# Patient Record
Sex: MALE | NOT HISPANIC OR LATINO | Employment: OTHER | URBAN - METROPOLITAN AREA
[De-identification: names, ages, dates, MRNs, and addresses within clinical notes are randomized per-mention and may not be internally consistent; named-entity substitution may affect disease eponyms.]

---

## 2024-10-10 ENCOUNTER — OFFICE VISIT (OUTPATIENT)
Dept: PRIMARY CARE | Facility: CLINIC | Age: 69
End: 2024-10-10
Payer: MEDICARE

## 2024-10-10 VITALS
TEMPERATURE: 98.8 F | HEIGHT: 70 IN | WEIGHT: 190 LBS | BODY MASS INDEX: 27.2 KG/M2 | RESPIRATION RATE: 14 BRPM | HEART RATE: 67 BPM | OXYGEN SATURATION: 98 % | DIASTOLIC BLOOD PRESSURE: 78 MMHG | SYSTOLIC BLOOD PRESSURE: 132 MMHG

## 2024-10-10 DIAGNOSIS — Z11.59 ENCOUNTER FOR HEPATITIS C SCREENING TEST FOR LOW RISK PATIENT: ICD-10-CM

## 2024-10-10 DIAGNOSIS — E55.9 VITAMIN D DEFICIENCY: ICD-10-CM

## 2024-10-10 DIAGNOSIS — Z76.89 ESTABLISHING CARE WITH NEW DOCTOR, ENCOUNTER FOR: Primary | ICD-10-CM

## 2024-10-10 DIAGNOSIS — Z12.5 PROSTATE CANCER SCREENING: ICD-10-CM

## 2024-10-10 DIAGNOSIS — Z13.6 SCREENING FOR CARDIOVASCULAR CONDITION: ICD-10-CM

## 2024-10-10 DIAGNOSIS — Z00.00 MEDICARE ANNUAL WELLNESS VISIT, SUBSEQUENT: ICD-10-CM

## 2024-10-10 PROCEDURE — 1170F FXNL STATUS ASSESSED: CPT | Performed by: FAMILY MEDICINE

## 2024-10-10 PROCEDURE — 3008F BODY MASS INDEX DOCD: CPT | Performed by: FAMILY MEDICINE

## 2024-10-10 PROCEDURE — 1159F MED LIST DOCD IN RCRD: CPT | Performed by: FAMILY MEDICINE

## 2024-10-10 PROCEDURE — G0439 PPPS, SUBSEQ VISIT: HCPCS | Performed by: FAMILY MEDICINE

## 2024-10-10 PROCEDURE — 1160F RVW MEDS BY RX/DR IN RCRD: CPT | Performed by: FAMILY MEDICINE

## 2024-10-10 PROCEDURE — 1158F ADVNC CARE PLAN TLK DOCD: CPT | Performed by: FAMILY MEDICINE

## 2024-10-10 PROCEDURE — 1123F ACP DISCUSS/DSCN MKR DOCD: CPT | Performed by: FAMILY MEDICINE

## 2024-10-10 PROCEDURE — 1126F AMNT PAIN NOTED NONE PRSNT: CPT | Performed by: FAMILY MEDICINE

## 2024-10-10 PROCEDURE — 99215 OFFICE O/P EST HI 40 MIN: CPT | Performed by: FAMILY MEDICINE

## 2024-10-10 PROCEDURE — 1157F ADVNC CARE PLAN IN RCRD: CPT | Performed by: FAMILY MEDICINE

## 2024-10-10 ASSESSMENT — PATIENT HEALTH QUESTIONNAIRE - PHQ9
SUM OF ALL RESPONSES TO PHQ9 QUESTIONS 1 & 2: 0
1. LITTLE INTEREST OR PLEASURE IN DOING THINGS: NOT AT ALL
2. FEELING DOWN, DEPRESSED OR HOPELESS: NOT AT ALL

## 2024-10-10 ASSESSMENT — ENCOUNTER SYMPTOMS
DEPRESSION: 0
LOSS OF SENSATION IN FEET: 0
OCCASIONAL FEELINGS OF UNSTEADINESS: 0

## 2024-10-10 ASSESSMENT — ACTIVITIES OF DAILY LIVING (ADL)
BATHING: INDEPENDENT
DOING HOUSEWORK: INDEPENDENT
STIL DRIVING: YES
PREPARING MEALS: INDEPENDENT
DRESSING: INDEPENDENT
ADEQUATE_TO_COMPLETE_ADL: YES
PILL BOX USED: NO
JUDGMENT_ADEQUATE_SAFELY_COMPLETE_DAILY_ACTIVITIES: YES
TOILETING: INDEPENDENT
TAKING MEDICATION: INDEPENDENT
FEEDING: INDEPENDENT
MANAGING FINANCES: INDEPENDENT
USING TELEPHONE: INDEPENDENT
EATING: INDEPENDENT
GROCERY SHOPPING: INDEPENDENT
NEEDS ASSISTANCE WITH FOOD: INDEPENDENT
USING TRANSPORTATION: INDEPENDENT

## 2024-10-10 ASSESSMENT — COLUMBIA-SUICIDE SEVERITY RATING SCALE - C-SSRS
1. IN THE PAST MONTH, HAVE YOU WISHED YOU WERE DEAD OR WISHED YOU COULD GO TO SLEEP AND NOT WAKE UP?: NO
6. HAVE YOU EVER DONE ANYTHING, STARTED TO DO ANYTHING, OR PREPARED TO DO ANYTHING TO END YOUR LIFE?: NO
2. HAVE YOU ACTUALLY HAD ANY THOUGHTS OF KILLING YOURSELF?: NO

## 2024-10-10 ASSESSMENT — PAIN SCALES - GENERAL: PAINLEVEL: 0-NO PAIN

## 2024-10-10 NOTE — PATIENT INSTRUCTIONS
Problem List Items Addressed This Visit    None  Visit Diagnoses         Codes    Establishing care with new doctor, encounter for    -  Primary Z76.89    Medicare annual wellness visit, subsequent     Z00.00    Relevant Orders    Prostate Specific Antigen, Screen    Hepatitis C Antibody    Prostate cancer screening     Z12.5    Relevant Orders    Prostate Specific Antigen, Screen    Encounter for hepatitis C screening test for low risk patient     Z11.59    Relevant Orders    Hepatitis C Antibody    Screening for cardiovascular condition     Z13.6    Vitamin D deficiency     E55.9            Additional Visit Plans:  Notes:  PREVENTATIVE HEALTH SCREENINGS INCLUDED:  - Blood pressure screen.  - Blood work may include a cholesterol and diabetes screen if risk factors exist (overweight, high blood pressure etc); screening for sexually transmitted infections; a one time HIV screen for all individuals, and Hepatitis C Virus screening  - I encourage you to eat a low-fat, moderate-carbohydrate, low-calorie diet to maintain a normal BMI (under 25) to reduce heart disease, and risk for diabetes  - Moderate intensity exercise for 30 minutes 5 days per week is recommended  - Helpful resources recommended by the American Academy of Family Practice can be found at www.familydoctor.org or www.choosemyplate.gov  - Can also consider enrolling in a program such as Weight Watchers or Annita Esparzaig. The most effective diet is going to be one you can follow long term and turn into a lifestyle. The CDC recommends losing 0.5-2 lbs a week if overweight or obese.  - I recommend a routine vision check and dental cleanings every 6 months    - Colon cancer screening for all ages 45-75 or 85 years old periodically depending on results.  Done this year, you will send me a report.   - For men and women who have a 20 pack year smoking history and currently smoke or have quit in the past 15 years, screening for lung cancer with a yearly low dose CT  scan is recommended starting at age 50 until age 77 years - not due yet. This is a shared decision making visit.   - For men only who have smoked 100+ cigarettes anytime in their lifetime, a one time ultrasound screening for for abdominal aortic aneurysms starting at age 65 until 75 years old      IMMUNIZATIONS:  - Flu shot annually.  Due at this time, [facility]  - Tetanus booster every 10 years.  Up-to-date, next due 2033  - Two pneumococcal vaccinations after 65 years old.  Completed  - Shingles vaccine for those 50 years or older.  Completed    This was a shared decision making visit.    All [facility] including [facility] D [facility]     Next Wellness Exam Due  In 1 year from today      Francis Nash DO   10/10/24   4:14 PM

## 2024-10-10 NOTE — PROGRESS NOTES
Outpatient Visit Note    Chief Complaint   Patient presents with    Annual Exam       HPI:  Thomas Sever is a 69 y.o. male here  for a Medicare Wellness Visit and to establish care.    He has a history of vitamin D deficiency.  Is currently on an OTC 3000IU daily supplement for this. [facility]     [facility] all his labs and orders done there. Going there next month for his wellness check.     Health Maintenance  Immunizations: Shingles vaccination completed.  Tdap due 2033.  Pneumonia up-to-date.  Influenza is due.    Denies smoking or illicit drug use, drinks 0 alcoholic beverages a week. Patient reports routine vision checks and dental cleanings, and regular exercise with hiking and walking. Patient is not fasting for routine blood work today.     [facility] and [facility].     Otherwise denies fevers, chills, cold/flu symptoms, SOB, CP, N/V, abdominal pain, dysuria, hematuria, melena, diarrhea or LE edema     Advanced directives: in place      Living Will: Received 3/9/2015    Healthcare Power of Atty: Not Received     Hearing screen: reports difficulty with hearing, had hearing aides.     Does the patient use opioid medications: No    How does the patient rate their health status today: good    Cognitive Screen:  AAAx3  to person, place and time: Yes  3 word recall: watch, banana, pen - Immediate recall: Yes         - 5 minutes recall: Yes   Impression: No cognitive deficiency observed during screening or encounter today    Reviewed:   Past Medical History/Allergies:  Yes  Family History:  Yes  Social History:  Yes  Current Medications:  Yes  Vital Signs:  Yes  Advanced Directives:  discussed  Immunizations:  reviewed today  Home Safety:                    Up & Go test > 30 seconds?  No                   Home have rugs; lack grab bars in bathroom; lack handrail on stairs; have poor lighting?  No                    Hearing difficulties?  yes  Geriatric Assessment                   ADL areas requiring assistance:  Does not need help with Dressing, Eating, Ambulating, Toileting, Grooming, Hygiene.                    IADL areas requiring assistance:  Does not need help with Shopping, Housework, Accounting, Transportation, Driving.   Medications: reviewed  Current supplements:  Reviewed and recorded.   Other providers: Reviewed and recorded - Current providers and suppliers: Dr. Nash, [facility], Dr. Hansen, [facility]    Vision results:  No results found.     PHQ9/GAD7:         There are no problems to display for this patient.       History reviewed. No pertinent past medical history.     Current Medications  No current outpatient medications     Allergies  Allergies   Allergen Reactions    Amoxicillin-Pot Clavulanate Rash    Shellfish Containing Products Rash        Immunizations  Immunization History   Administered Date(s) Administered    Flu vaccine, quadrivalent, high-dose, preservative free, age 65y+ (FLUZONE) 09/30/2023    Flu vaccine, trivalent, preservative free, HIGH-DOSE, age 65y+ (Fluzone) 10/01/2019, 03/03/2020    Flu vaccine, trivalent, preservative free, age 6 months and greater (Fluarix/Fluzone/Flulaval) 10/01/2014    Influenza, injectable, quadrivalent 09/16/2017    Moderna SARS-CoV-2 Vaccination 01/05/2021, 02/02/2021, 11/02/2021, 07/11/2022    Pfizer COVID-19 vaccine, 12 years and older, (30mcg/0.3mL) (Comirnaty) 11/13/2023    Pneumococcal conjugate vaccine, 13-valent (PREVNAR 13) 01/01/2016    Pneumococcal polysaccharide vaccine, 23-valent, age 2 years and older (PNEUMOVAX 23) 10/01/2019    Tdap vaccine, age 7 year and older (BOOSTRIX, ADACEL) 01/01/2014, 11/13/2023    Zoster vaccine, recombinant, adult (SHINGRIX) 03/01/2019, 05/01/2019    Zoster, live 01/01/2016        Past Surgical History:   Procedure Laterality Date    APPENDECTOMY  03/09/2015    Appendectomy     CHOLECYSTECTOMY  03/09/2015    Cholecystectomy Laparoscopic    HEMORRHOID SURGERY  03/09/2015    Hemorrhoidectomy    OTHER SURGICAL HISTORY  03/09/2015    Amputation Of Ring Finger, With Neurectomy (Each)    TONSILLECTOMY  03/09/2015    Tonsillectomy    VASECTOMY  03/09/2015    Surgery Vas Deferens Vasectomy     No family history on file.  Social History     Tobacco Use    Smoking status: Never    Smokeless tobacco: Never   Substance Use Topics    Alcohol use: Never    Drug use: Never     Tobacco Use: Low Risk  (10/10/2024)    Patient History     Smoking Tobacco Use: Never     Smokeless Tobacco Use: Never     Passive Exposure: Not on file        ROS  All pertinent positive symptoms are included in the history of present illness.  All other systems have been reviewed and are negative and noncontributory to this patient's current ailments.    VITAL SIGNS  Vitals:    10/10/24 1536   BP: 132/78   Pulse: 67   Resp: 14   Temp: 37.1 °C (98.8 °F)   SpO2: 98%     Vitals:    10/10/24 1536   Weight: 86.2 kg (190 lb)      Body mass index is 27.26 kg/m².     PHYSICAL EXAM  GENERAL APPEARANCE: well nourished, well developed, looks like stated age, in no acute distress, not ill or tired appearing, conversing well.   HEENT: no trauma, normocephalic.   NECK: supple without rigidity, no neck mass was observed.   LUNGS: good chest wall expansion. In no acute respiratory distress.   EXTREMITIES: moving all extremities equally with no edema.   SKIN: normal skin color and pigmentation, without rash.   NEUROLOGIC EXAM: CN II-XII grossly intact, normal gait.   PSYCH: mood and affect appropriate; alert and oriented to time, place, person; no difficulty with speech or language.     Counseling:       Medication education:           Education:  The patient is counseled regarding potential side-effects of all new medications          Understanding:  Patient expressed understanding of information conveyed today          Adherence:  No barriers  to adherence identified    Preventative Services reviewed with patient and copy printed for patient.    Assessment/Plan   Problem List Items Addressed This Visit    None  Visit Diagnoses         Codes    Establishing care with new doctor, encounter for    -  Primary Z76.89    Medicare annual wellness visit, subsequent     Z00.00    Relevant Orders    Prostate Specific Antigen, Screen    Hepatitis C Antibody    Prostate cancer screening     Z12.5    Relevant Orders    Prostate Specific Antigen, Screen    Encounter for hepatitis C screening test for low risk patient     Z11.59    Relevant Orders    Hepatitis C Antibody    Screening for cardiovascular condition     Z13.6    Vitamin D deficiency     E55.9            Additional Visit Plans:  Notes:  PREVENTATIVE HEALTH SCREENINGS INCLUDED:  - Blood pressure screen.  - Blood work may include a cholesterol and diabetes screen if risk factors exist (overweight, high blood pressure etc); screening for sexually transmitted infections; a one time HIV screen for all individuals, and Hepatitis C Virus screening  - I encourage you to eat a low-fat, moderate-carbohydrate, low-calorie diet to maintain a normal BMI (under 25) to reduce heart disease, and risk for diabetes  - Moderate intensity exercise for 30 minutes 5 days per week is recommended  - Helpful resources recommended by the American Academy of Family Practice can be found at www.familydoctor.org or www.choosemyplate.gov  - Can also consider enrolling in a program such as Weight Watchers or Annita Esparzaig. The most effective diet is going to be one you can follow long term and turn into a lifestyle. The CDC recommends losing 0.5-2 lbs a week if overweight or obese.  - I recommend a routine vision check and dental cleanings every 6 months    - Colon cancer screening for all ages 45-75 or 85 years old periodically depending on results.  Done this year, you will send me a report.   - For men and women who have a 20 pack year  smoking history and currently smoke or have quit in the past 15 years, screening for lung cancer with a yearly low dose CT scan is recommended starting at age 50 until age 77 years - not due yet. This is a shared decision making visit.   - For men only who have smoked 100+ cigarettes anytime in their lifetime, a one time ultrasound screening for for abdominal aortic aneurysms starting at age 65 until 75 years old      IMMUNIZATIONS:  - Flu shot annually.  Due at this time, [facility]  - Tetanus booster every 10 years.  Up-to-date, next due 2033  - Two pneumococcal vaccinations after 65 years old.  Completed  - Shingles vaccine for those 50 years or older.  Completed    This was a shared decision making visit.    All [facility] including [facility] D [facility]     Next Wellness Exam Due  In 1 year from today      Francis Nash,    10/10/24   4:14 PM

## 2025-10-30 ENCOUNTER — APPOINTMENT (OUTPATIENT)
Facility: CLINIC | Age: 70
End: 2025-10-30
Payer: MEDICARE

## 2026-01-06 ENCOUNTER — APPOINTMENT (OUTPATIENT)
Dept: PRIMARY CARE | Facility: CLINIC | Age: 71
End: 2026-01-06
Payer: MEDICARE